# Patient Record
Sex: FEMALE | Race: BLACK OR AFRICAN AMERICAN | Employment: FULL TIME
[De-identification: names, ages, dates, MRNs, and addresses within clinical notes are randomized per-mention and may not be internally consistent; named-entity substitution may affect disease eponyms.]

---

## 2024-11-10 ENCOUNTER — HOSPITAL ENCOUNTER (EMERGENCY)
Facility: HOSPITAL | Age: 44
Discharge: HOME | End: 2024-11-10
Payer: COMMERCIAL

## 2024-11-10 VITALS
TEMPERATURE: 98.4 F | OXYGEN SATURATION: 98 % | DIASTOLIC BLOOD PRESSURE: 79 MMHG | SYSTOLIC BLOOD PRESSURE: 147 MMHG | RESPIRATION RATE: 16 BRPM | HEIGHT: 66 IN | HEART RATE: 76 BPM | WEIGHT: 195 LBS | BODY MASS INDEX: 31.34 KG/M2

## 2024-11-10 DIAGNOSIS — M79.605 LEFT LEG PAIN: Primary | ICD-10-CM

## 2024-11-10 PROCEDURE — 99284 EMERGENCY DEPT VISIT MOD MDM: CPT

## 2024-11-10 PROCEDURE — 6360000002 HC RX W HCPCS

## 2024-11-10 PROCEDURE — 96372 THER/PROPH/DIAG INJ SC/IM: CPT

## 2024-11-10 RX ORDER — METHOCARBAMOL 750 MG/1
750 TABLET, FILM COATED ORAL 4 TIMES DAILY
Qty: 40 TABLET | Refills: 0 | Status: SHIPPED | OUTPATIENT
Start: 2024-11-10 | End: 2024-11-20

## 2024-11-10 RX ORDER — LIDOCAINE 50 MG/G
1 PATCH TOPICAL DAILY
Qty: 10 PATCH | Refills: 0 | Status: SHIPPED | OUTPATIENT
Start: 2024-11-10 | End: 2024-11-20

## 2024-11-10 RX ORDER — KETOROLAC TROMETHAMINE 15 MG/ML
15 INJECTION, SOLUTION INTRAMUSCULAR; INTRAVENOUS
Status: COMPLETED | OUTPATIENT
Start: 2024-11-10 | End: 2024-11-10

## 2024-11-10 RX ORDER — IBUPROFEN 800 MG/1
800 TABLET, FILM COATED ORAL 2 TIMES DAILY PRN
Qty: 30 TABLET | Refills: 0 | Status: SHIPPED | OUTPATIENT
Start: 2024-11-10

## 2024-11-10 RX ADMIN — KETOROLAC TROMETHAMINE 15 MG: 15 INJECTION, SOLUTION INTRAMUSCULAR; INTRAVENOUS at 22:54

## 2024-11-10 ASSESSMENT — PAIN SCALES - GENERAL
PAINLEVEL_OUTOF10: 8
PAINLEVEL_OUTOF10: 9

## 2024-11-10 ASSESSMENT — PAIN DESCRIPTION - LOCATION
LOCATION: LEG
LOCATION: LEG

## 2024-11-10 ASSESSMENT — PAIN DESCRIPTION - ORIENTATION
ORIENTATION: LEFT
ORIENTATION: LEFT;UPPER

## 2024-11-10 ASSESSMENT — PAIN DESCRIPTION - DESCRIPTORS: DESCRIPTORS: ACHING

## 2024-11-10 ASSESSMENT — PAIN - FUNCTIONAL ASSESSMENT: PAIN_FUNCTIONAL_ASSESSMENT: 0-10

## 2024-11-11 NOTE — ED PROVIDER NOTES
EMERGENCY DEPARTMENT HISTORY AND PHYSICAL EXAM      Patient Name: Ginger Borrego  MRN: [id number]  YOB: 1980  Provider: Linette Rasmussen PA-C  PCP: Fox Jacques MD   Time/Date of evaluation: 11:57 PM EST on 11/10/24    History of Presenting Illness     Chief Complaint   Patient presents with    Leg Pain       History Provided By: Patient     History (Narative):   Ginger Borrego is a 44 y.o. female with no contributory PMHx who presents to the emergency department  by POV C/O of left leg pain.  Patient states that earlier today she went to bend down to  something and she heard 3 loud pops.  She states that she she felt like her bones were breaking.  She states that she has been elevating and icing her leg with some relief of symptoms.  She states it is predominantly in her mid thigh region and it radiates down her leg area.  She denies any prior surgeries or injuries any numbness or weakness.    Past History     Past Medical History:  No past medical history on file.    Past Surgical History:  No past surgical history on file.    Family History:  No family history on file.    Social History:       Medications:  No current facility-administered medications for this encounter.     Current Outpatient Medications   Medication Sig Dispense Refill    methocarbamol (ROBAXIN-750) 750 MG tablet Take 1 tablet by mouth 4 times daily for 10 days 40 tablet 0    ibuprofen (ADVIL;MOTRIN) 800 MG tablet Take 1 tablet by mouth 2 times daily as needed for Pain 30 tablet 0    lidocaine (LIDODERM) 5 % Place 1 patch onto the skin daily for 10 days 12 hours on, 12 hours off. 10 patch 0       Allergies:  Allergies   Allergen Reactions    Lisinopril Swelling       Social Determinants of Health:  Social Determinants of Health     Tobacco Use: Low Risk  (1/6/2022)    Received from [employer] and [facility]    Patient History     Smoking Tobacco Use: Never     Smokeless Tobacco Use: Never     Passive

## 2024-11-11 NOTE — ED TRIAGE NOTES
Pt arrived via triage ambulatory c/o left upper leg pain. Pt states she bent down to  something and heard 3 loud pops and has been having pain since that travels down the leg.

## 2024-11-13 ENCOUNTER — OFFICE VISIT (OUTPATIENT)
Age: 44
End: 2024-11-13
Payer: COMMERCIAL

## 2024-11-13 VITALS — HEIGHT: 66 IN | WEIGHT: 196 LBS | BODY MASS INDEX: 31.5 KG/M2

## 2024-11-13 DIAGNOSIS — M54.16 LUMBAR RADICULOPATHY: ICD-10-CM

## 2024-11-13 DIAGNOSIS — S73.192A TEAR OF LEFT ACETABULAR LABRUM, INITIAL ENCOUNTER: ICD-10-CM

## 2024-11-13 DIAGNOSIS — M25.552 LEFT HIP PAIN: Primary | ICD-10-CM

## 2024-11-13 PROCEDURE — 72170 X-RAY EXAM OF PELVIS: CPT | Performed by: ORTHOPAEDIC SURGERY

## 2024-11-13 PROCEDURE — 99204 OFFICE O/P NEW MOD 45 MIN: CPT | Performed by: ORTHOPAEDIC SURGERY

## 2024-11-13 RX ORDER — METHYLPREDNISOLONE 4 MG/1
TABLET ORAL
Qty: 1 KIT | Refills: 0 | Status: SHIPPED | OUTPATIENT
Start: 2024-11-13 | End: 2024-11-19

## 2024-11-13 NOTE — PROGRESS NOTES
file   Stress: Not on file   Social Connections: Not on file   Intimate Partner Violence: Not on file   Housing Stability: Not on file       History reviewed. No pertinent surgical history.      Patient seen evaluated today for pain advice regarding left lower extremity pain.  She states that this past Sunday she bent over and felt a pain as well as heard a pop which caused her lower leg hurt as well as some numbness down to her foot.  She states that she iced it and took it easy for the day however had so much pain she went to the emergency room.  She was prescribed a muscle relaxer as well as anti-inflammatories and instructed follow-up with orthopedics.  At this point she reports posterior buttocks pain over the left hip as well as radiating pain down her left lower extremity.  She has no groin pain currently.  No change in bowel or bladder habits.    Patient denies recent fevers, chills, chest pain, SOB, or injuries.   No recent systemic changes noted.  A 12-point review of systems is performed today.  Pertinent positives are noted.  All other systems reviewed and otherwise are negative.    Physical exam: General: Alert and oriented x3, nad.  well-developed, well nourished.  normal affect, AF.  NC/AT, EOMI, neck supple, trachea midline, no JVD present. Breathing is non-labored.  Examination of the lower extremities reveals good range of motion of the hips.  She does have some tenderness palpation trochanter bursa left side.  Discomfort with straight leg raise on the left lower extremity noted.  Does have little discomfort with resisted knee flexion.  Negative calf tenderness.  Negative Homans.  No signs of DVT present.    Radiographs obtained in office today 11/13/2024 at the [place] location include AP pelvis shows no acute abnormalities.  She does have some degenerative changes noted to the lower lumbar spine.    Assessment: #1 left hip pain, possible hamstring tendinosis versus tear, #2 lumbar 
no

## 2024-11-15 ENCOUNTER — TELEPHONE (OUTPATIENT)
Age: 44
End: 2024-11-15

## 2024-11-15 NOTE — TELEPHONE ENCOUNTER
Patient called in to request a work note due to the medication she's taking for her hip pain.     She says she isn't supposed to drive while taking the medication Robaxin along with other pain meds and she works in [place], [place].    She says she is able to telework from home. Her MRI is scheduled for next week.  She's asking to return to work 11/25.    Please advise and contact the patient back at [phone].

## 2024-11-17 ENCOUNTER — HOSPITAL ENCOUNTER (OUTPATIENT)
Facility: HOSPITAL | Age: 44
Discharge: HOME | End: 2024-11-20
Payer: COMMERCIAL

## 2024-11-17 DIAGNOSIS — S73.192A TEAR OF LEFT ACETABULAR LABRUM, INITIAL ENCOUNTER: ICD-10-CM

## 2024-11-17 DIAGNOSIS — M54.16 LUMBAR RADICULOPATHY: ICD-10-CM

## 2024-11-17 PROCEDURE — 73721 MRI JNT OF LWR EXTRE W/O DYE: CPT

## 2024-11-17 PROCEDURE — 72148 MRI LUMBAR SPINE W/O DYE: CPT

## 2024-11-18 ENCOUNTER — HOSPITAL ENCOUNTER (OUTPATIENT)
Facility: HOSPITAL | Age: 44
Discharge: HOME | End: 2024-11-21
Payer: COMMERCIAL

## 2024-11-18 ENCOUNTER — TELEPHONE (OUTPATIENT)
Age: 44
End: 2024-11-18

## 2024-11-18 DIAGNOSIS — S73.192D TEAR OF LEFT ACETABULAR LABRUM, SUBSEQUENT ENCOUNTER: ICD-10-CM

## 2024-11-18 DIAGNOSIS — M79.652 LEFT THIGH PAIN: Primary | ICD-10-CM

## 2024-11-18 PROCEDURE — 73718 MRI LOWER EXTREMITY W/O DYE: CPT

## 2024-11-18 NOTE — TELEPHONE ENCOUNTER
Tracey from the MRI department states that there were some abnormal findings while the patient MRI of the left hip were done, so the mri tech also scanned the patient left thigh, and now the mri department is requesting a new order for the patient left thigh.    Tracey from the MRI department   -phone [phone]  -fax [phone]

## 2024-11-22 ENCOUNTER — OFFICE VISIT (OUTPATIENT)
Age: 44
End: 2024-11-22

## 2024-11-22 DIAGNOSIS — S73.192A TEAR OF LEFT ACETABULAR LABRUM, INITIAL ENCOUNTER: Primary | ICD-10-CM

## 2024-11-22 NOTE — PROGRESS NOTES
on ibuprofen.  She states that her symptoms have improved however still having some pain in her left buttocks going down her leg.  No change in bowel or bladder habits.  She denies numbness and tingling.    Patient denies recent fevers, chills, chest pain, SOB, or injuries.   No recent systemic changes noted.  A 12-point review of systems is performed today.  Pertinent positives are noted.  All other systems reviewed and otherwise are negative.    Physical exam: General: Alert and oriented x3, nad.  well-developed, well nourished.  normal affect, AF.  NC/AT, EOMI, neck supple, trachea midline, no JVD present. Breathing is non-labored.  Examination of the lower extremities reveals pain-free range of motion of the hips.  There are some slight tenderness palpation the trochanter bursa left side.  She does have a little discomfort with straight leg raise in the left lower extremity.  She has good knee flexion and strength.  Good hip flexion and strength.  Good leg extension.  Neurovascular status intact.    Review of MRI left hip and femur:  IMPRESSION:     Multifocal myotendinous low-grade partial-thickness tears and muscular strains  in the posterior and medial thigh compartments most pronounced involving the  proximal semimembranosus myotendinous junction and the adductor jeferson muscle.  -Small hematoma along the semimembranosus tendon in the proximal thigh.     -Soft tissue edema and fluid in the fascial planes posterior and medial thigh  compartments including fluid along the course of the sciatic nerve.    Review of MRI lumbar spine:  IMPRESSION:     Mild lower lumbar degenerative changes without central canal or foraminal  stenosis.  -Small L5-S1 central disc protrusion without neural compression.     Assessment: Left lower extremity muscle strain, myofascial tendinitis    Plan: At this point, we discussed treatment options.  Will get her into physical therapy for gentle strengthening and modalities as needed.